# Patient Record
Sex: FEMALE | Race: WHITE | URBAN - METROPOLITAN AREA
[De-identification: names, ages, dates, MRNs, and addresses within clinical notes are randomized per-mention and may not be internally consistent; named-entity substitution may affect disease eponyms.]

---

## 2020-05-21 ENCOUNTER — OFFICE VISIT (OUTPATIENT)
Dept: URBAN - METROPOLITAN AREA CLINIC 29 | Facility: CLINIC | Age: 80
End: 2020-05-21
Payer: MEDICARE

## 2020-05-21 DIAGNOSIS — H40.013 OPEN ANGLE WITH BORDERLINE FINDINGS, LOW RISK, BILATERAL: Primary | ICD-10-CM

## 2020-05-21 DIAGNOSIS — H25.013 CORTICAL AGE-RELATED CATARACT, BILATERAL: ICD-10-CM

## 2020-05-21 PROCEDURE — 92004 COMPRE OPH EXAM NEW PT 1/>: CPT | Performed by: OPTOMETRIST

## 2020-05-21 PROCEDURE — 92133 CPTRZD OPH DX IMG PST SGM ON: CPT | Performed by: OPTOMETRIST

## 2020-05-21 ASSESSMENT — INTRAOCULAR PRESSURE
OD: 20
OS: 20

## 2020-07-01 ENCOUNTER — OFFICE VISIT (OUTPATIENT)
Dept: URBAN - METROPOLITAN AREA CLINIC 29 | Facility: CLINIC | Age: 80
End: 2020-07-01
Payer: MEDICARE

## 2020-07-01 DIAGNOSIS — H25.813 COMBINED FORMS OF AGE-RELATED CATARACT, BILATERAL: Primary | ICD-10-CM

## 2020-07-01 PROCEDURE — 99204 OFFICE O/P NEW MOD 45 MIN: CPT | Performed by: OPHTHALMOLOGY

## 2020-07-01 ASSESSMENT — VISUAL ACUITY
OS: 20/30
OD: 20/40

## 2020-07-01 ASSESSMENT — INTRAOCULAR PRESSURE
OD: 18
OS: 18

## 2020-07-01 NOTE — IMPRESSION/PLAN
Impression: Combined forms of age-related cataract, bilateral: H25.813. .  Visually significant, quality of life issue, could improve with surgery. Plan: Cataracts account for the patient's complaints. Discussed all risks, benefits, alternatives, procedures and recovery. Patient understands changing glasses will not improve vision. Patient desires to have surgery, recommend phacoemulsification with intraocular lens implant OD.  lvl 2 - pt considering toric IOL -  Re-evaluate OS for possible cataract surgery after OD is done.

## 2020-08-03 ENCOUNTER — OFFICE VISIT (OUTPATIENT)
Dept: URBAN - METROPOLITAN AREA CLINIC 29 | Facility: CLINIC | Age: 80
End: 2020-08-03
Payer: MEDICARE

## 2020-08-03 PROCEDURE — 92136 OPHTHALMIC BIOMETRY: CPT | Performed by: OPHTHALMOLOGY

## 2020-08-03 ASSESSMENT — PACHYMETRY
OS: 3.03
OD: 23.62
OD: 2.90
OS: 23.39

## 2020-08-10 ENCOUNTER — SURGERY (OUTPATIENT)
Dept: URBAN - METROPOLITAN AREA SURGERY 11 | Facility: SURGERY | Age: 80
End: 2020-08-10
Payer: MEDICARE

## 2020-08-10 PROCEDURE — 66984 XCAPSL CTRC RMVL W/O ECP: CPT | Performed by: OPHTHALMOLOGY

## 2020-08-11 ENCOUNTER — POST-OPERATIVE VISIT (OUTPATIENT)
Dept: URBAN - METROPOLITAN AREA CLINIC 29 | Facility: CLINIC | Age: 80
End: 2020-08-11
Payer: MEDICARE

## 2020-08-11 DIAGNOSIS — Z48.810 ENCNTR FOR SURGICAL AFTCR FOL SURGERY ON THE SENSE ORGANS: ICD-10-CM

## 2020-08-11 DIAGNOSIS — Z09 ENCNTR FOR F/U EXAM AFT TRTMT FOR COND OTH THAN MALIG NEOPLM: Primary | ICD-10-CM

## 2020-08-11 ASSESSMENT — INTRAOCULAR PRESSURE
OS: 18
OD: 21

## 2020-08-14 ENCOUNTER — POST-OPERATIVE VISIT (OUTPATIENT)
Dept: URBAN - METROPOLITAN AREA CLINIC 29 | Facility: CLINIC | Age: 80
End: 2020-08-14
Payer: MEDICARE

## 2020-08-14 ASSESSMENT — INTRAOCULAR PRESSURE
OS: 15
OD: 15

## 2020-08-19 ENCOUNTER — OFFICE VISIT (OUTPATIENT)
Dept: URBAN - METROPOLITAN AREA CLINIC 29 | Facility: CLINIC | Age: 80
End: 2020-08-19
Payer: MEDICARE

## 2020-08-19 DIAGNOSIS — H25.812 COMBINED FORMS OF AGE-RELATED CATARACT, LEFT EYE: Primary | ICD-10-CM

## 2020-08-19 RX ORDER — POLYMYXIN B SULFATE AND TRIMETHOPRIM SULFATE 100000; 1 [USP'U]/ML; MG/ML
SOLUTION/ DROPS OPHTHALMIC
Qty: 5 | Refills: 1 | Status: INACTIVE
Start: 2020-08-19 | End: 2020-11-18

## 2020-08-19 ASSESSMENT — INTRAOCULAR PRESSURE
OD: 16
OS: 16

## 2020-08-19 ASSESSMENT — VISUAL ACUITY
OS: 20/25
OD: 20/25

## 2020-08-19 NOTE — IMPRESSION/PLAN
Impression: Combined forms of age-related cataract, left eye: H25.812. .  Visually significant, quality of life issue, could improve with surgery. Plan: Cataracts account for the patient's complaints. Discussed all risks, benefits, alternatives, procedures and recovery. Patient understands changing glasses will not improve vision. Patient desires to have surgery, recommend phacoemulsification with intraocular lens implant OS. lvl 2 - pt wants toric IOL - AIM plano.

## 2020-08-19 NOTE — IMPRESSION/PLAN
Impression: Presence of intraocular lens: Z96.1. Condition: established, stable. Plan: d/c abx, taper steroid per plan. d/c shield.  will change abx for 2nd eye, may be allergic to ofloxacin

## 2020-08-24 ENCOUNTER — SURGERY (OUTPATIENT)
Dept: URBAN - METROPOLITAN AREA SURGERY 11 | Facility: SURGERY | Age: 80
End: 2020-08-24
Payer: MEDICARE

## 2020-08-24 PROCEDURE — 66984 XCAPSL CTRC RMVL W/O ECP: CPT | Performed by: OPHTHALMOLOGY

## 2020-08-25 ENCOUNTER — POST-OPERATIVE VISIT (OUTPATIENT)
Dept: URBAN - METROPOLITAN AREA CLINIC 29 | Facility: CLINIC | Age: 80
End: 2020-08-25
Payer: MEDICARE

## 2020-08-25 DIAGNOSIS — Z96.1 PRESENCE OF INTRAOCULAR LENS: Primary | ICD-10-CM

## 2020-09-01 ENCOUNTER — POST-OPERATIVE VISIT (OUTPATIENT)
Dept: URBAN - METROPOLITAN AREA CLINIC 29 | Facility: CLINIC | Age: 80
End: 2020-09-01
Payer: MEDICARE

## 2020-09-01 NOTE — IMPRESSION/PLAN
Impression: S/P CE/Toric IOL SN6AT3{1.5D} ASPHERIC 21.0 OS - 8 Days. Presence of intraocular lens  Z96.1. Plan: --Taper Prednisolone acetate 1% QID x 1 wk, TID x 1 wk, BID x 1wk, QD x 1wk, then d/c--Advised patient to use artificial tears for comfort.

## 2020-09-16 ENCOUNTER — POST-OPERATIVE VISIT (OUTPATIENT)
Dept: URBAN - METROPOLITAN AREA CLINIC 29 | Facility: CLINIC | Age: 80
End: 2020-09-16
Payer: MEDICARE

## 2020-09-16 RX ORDER — PREDNISOLONE ACETATE 10 MG/ML
1 % SUSPENSION/ DROPS OPHTHALMIC
Qty: 5 | Refills: 1 | Status: INACTIVE
Start: 2020-09-16 | End: 2020-11-18

## 2020-09-16 ASSESSMENT — INTRAOCULAR PRESSURE
OS: 17
OD: 17

## 2020-09-16 NOTE — IMPRESSION/PLAN
Impression: S/P Phaco - Toric IOL SN6AT3[1.5D] +21.00 OD - 37 Days. Encounter for surgical aftercare following surgery on a sense organ  Z48.810. Post operative instructions reviewed - pt. has mild allergy OU, but doesn't appear to be drops. suggest hydrocortisone cream to lids tid and zaditor bid for itching.  Plan: zaditor and hydrocortisone cream as per above, tears, cont drops per plan

## 2020-11-18 ENCOUNTER — OFFICE VISIT (OUTPATIENT)
Dept: URBAN - METROPOLITAN AREA CLINIC 29 | Facility: CLINIC | Age: 80
End: 2020-11-18
Payer: MEDICARE

## 2020-11-18 DIAGNOSIS — H26.493 OTHER SECONDARY CATARACT, BILATERAL: Primary | ICD-10-CM

## 2020-11-18 PROCEDURE — 99214 OFFICE O/P EST MOD 30 MIN: CPT | Performed by: OPHTHALMOLOGY

## 2020-11-18 ASSESSMENT — INTRAOCULAR PRESSURE
OD: 17
OS: 17

## 2020-11-18 NOTE — IMPRESSION/PLAN
Impression: Other secondary cataract, bilateral: H26.493. [facility] Visually significant, quality of life issue, could improve with surgery. Plan: Discussed all risks, benefits, alternatives, procedures and recovery. Patient understands changing glasses will not improve vision. Patient desires to have surgery, recommend yag capsulotomy OS.

## 2020-12-17 ENCOUNTER — SURGERY (OUTPATIENT)
Dept: URBAN - METROPOLITAN AREA SURGERY 11 | Facility: SURGERY | Age: 80
End: 2020-12-17
Payer: MEDICARE

## 2020-12-17 PROCEDURE — 66821 AFTER CATARACT LASER SURGERY: CPT | Performed by: OPHTHALMOLOGY

## 2020-12-28 ENCOUNTER — POST-OPERATIVE VISIT (OUTPATIENT)
Dept: URBAN - METROPOLITAN AREA CLINIC 16 | Facility: CLINIC | Age: 80
End: 2020-12-28
Payer: MEDICARE

## 2020-12-28 PROCEDURE — 99024 POSTOP FOLLOW-UP VISIT: CPT | Performed by: OPTOMETRIST

## 2020-12-28 ASSESSMENT — INTRAOCULAR PRESSURE
OS: 16
OD: 16

## 2020-12-28 NOTE — IMPRESSION/PLAN
Impression:  Encounter for surgical aftercare following surgery on a sense organ  Z48.810. Plan: Schedule Yag OD --Advised patient to use artificial tears for comfort.

## 2021-01-04 ENCOUNTER — SURGERY (OUTPATIENT)
Dept: URBAN - METROPOLITAN AREA SURGERY 11 | Facility: SURGERY | Age: 81
End: 2021-01-04
Payer: OTHER MISCELLANEOUS

## 2021-01-04 PROCEDURE — 66821 AFTER CATARACT LASER SURGERY: CPT | Performed by: OPHTHALMOLOGY

## 2021-01-11 ENCOUNTER — POST-OPERATIVE VISIT (OUTPATIENT)
Dept: URBAN - METROPOLITAN AREA CLINIC 29 | Facility: CLINIC | Age: 81
End: 2021-01-11
Payer: OTHER MISCELLANEOUS

## 2021-01-11 PROCEDURE — 99024 POSTOP FOLLOW-UP VISIT: CPT | Performed by: OPTOMETRIST

## 2021-01-11 ASSESSMENT — INTRAOCULAR PRESSURE
OS: 16
OD: 16

## 2021-01-11 NOTE — IMPRESSION/PLAN
Impression: S/P YAG Capsulotomy (Yttrium Aluminum [place]) OD - 7 Days. Encounter for surgical aftercare following surgery on a sense organ  Z48.810. Plan: RTC 1 year x CEE. --Advised patient to use artificial tears for comfort.

## 2022-03-15 ENCOUNTER — OFFICE VISIT (OUTPATIENT)
Dept: URBAN - METROPOLITAN AREA CLINIC 23 | Facility: CLINIC | Age: 82
End: 2022-03-15
Payer: OTHER MISCELLANEOUS

## 2022-03-15 DIAGNOSIS — H43.813 VITREOUS DEGENERATION, BILATERAL: Primary | ICD-10-CM

## 2022-03-15 PROCEDURE — 99213 OFFICE O/P EST LOW 20 MIN: CPT | Performed by: OPTOMETRIST

## 2022-03-15 PROCEDURE — 92133 CPTRZD OPH DX IMG PST SGM ON: CPT | Performed by: OPTOMETRIST

## 2022-03-15 ASSESSMENT — INTRAOCULAR PRESSURE
OS: 16
OD: 16

## 2022-03-15 ASSESSMENT — KERATOMETRY
OD: 43.63
OS: 43.63

## 2022-03-15 NOTE — IMPRESSION/PLAN
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 Condition: stable. Plan: Discussed findings. RNFL OCT ordered and reviewed. No signs of optic nerve swelling, RNFL OCT showed normal ONH. Advised patient to call with any visual changes, monitor yearly.